# Patient Record
Sex: FEMALE | Race: WHITE | Employment: UNEMPLOYED | URBAN - METROPOLITAN AREA
[De-identification: names, ages, dates, MRNs, and addresses within clinical notes are randomized per-mention and may not be internally consistent; named-entity substitution may affect disease eponyms.]

---

## 2018-09-28 ENCOUNTER — HOSPITAL ENCOUNTER (EMERGENCY)
Facility: CLINIC | Age: 28
Discharge: HOME | End: 2018-09-29
Attending: EMERGENCY MEDICINE | Admitting: EMERGENCY MEDICINE
Payer: MEDICAID

## 2018-09-28 ENCOUNTER — APPOINTMENT (OUTPATIENT)
Dept: ULTRASOUND IMAGING | Facility: CLINIC | Age: 28
End: 2018-09-28
Attending: EMERGENCY MEDICINE
Payer: MEDICAID

## 2018-09-28 DIAGNOSIS — O20.0 THREATENED MISCARRIAGE IN EARLY PREGNANCY: ICD-10-CM

## 2018-09-28 LAB
BASOPHILS # BLD AUTO: 0 10E9/L (ref 0–0.2)
BASOPHILS NFR BLD AUTO: 0.1 %
DIFFERENTIAL METHOD BLD: ABNORMAL
EOSINOPHIL # BLD AUTO: 0.1 10E9/L (ref 0–0.7)
EOSINOPHIL NFR BLD AUTO: 1.3 %
ERYTHROCYTE [DISTWIDTH] IN BLOOD BY AUTOMATED COUNT: 14.9 % (ref 10–15)
HCT VFR BLD AUTO: 34.1 % (ref 35–47)
HGB BLD-MCNC: 11.9 G/DL (ref 11.7–15.7)
IMM GRANULOCYTES # BLD: 0 10E9/L (ref 0–0.4)
IMM GRANULOCYTES NFR BLD: 0 %
LYMPHOCYTES # BLD AUTO: 2.8 10E9/L (ref 0.8–5.3)
LYMPHOCYTES NFR BLD AUTO: 41.7 %
MCH RBC QN AUTO: 29.4 PG (ref 26.5–33)
MCHC RBC AUTO-ENTMCNC: 34.9 G/DL (ref 31.5–36.5)
MCV RBC AUTO: 84 FL (ref 78–100)
MONOCYTES # BLD AUTO: 0.6 10E9/L (ref 0–1.3)
MONOCYTES NFR BLD AUTO: 8.8 %
NEUTROPHILS # BLD AUTO: 3.2 10E9/L (ref 1.6–8.3)
NEUTROPHILS NFR BLD AUTO: 48.1 %
NRBC # BLD AUTO: 0 10*3/UL
NRBC BLD AUTO-RTO: 0 /100
PLATELET # BLD AUTO: 160 10E9/L (ref 150–450)
RBC # BLD AUTO: 4.05 10E12/L (ref 3.8–5.2)
WBC # BLD AUTO: 6.7 10E9/L (ref 4–11)

## 2018-09-28 PROCEDURE — 84702 CHORIONIC GONADOTROPIN TEST: CPT | Performed by: EMERGENCY MEDICINE

## 2018-09-28 PROCEDURE — 76801 OB US < 14 WKS SINGLE FETUS: CPT

## 2018-09-28 PROCEDURE — 99284 EMERGENCY DEPT VISIT MOD MDM: CPT | Mod: 25

## 2018-09-28 PROCEDURE — 85025 COMPLETE CBC W/AUTO DIFF WBC: CPT | Performed by: EMERGENCY MEDICINE

## 2018-09-28 PROCEDURE — 86901 BLOOD TYPING SEROLOGIC RH(D): CPT | Performed by: EMERGENCY MEDICINE

## 2018-09-28 NOTE — ED AVS SNAPSHOT
Emergency Department    [address] [facility] [address]    Phone:  [phone]    Fax:  [phone]                                       Ariana Moe   MRN: [id number]    Department:   Emergency Department   Date of Visit:  9/28/2018           Patient Information     Date Of Birth          1990        Your diagnoses for this visit were:     Threatened miscarriage in early pregnancy        You were seen by Chris Boucher MD.      Follow-up Information     Schedule an appointment as soon as possible for a visit with martinez doctor de cabecera.        Follow up with  Emergency Department.    Specialty:  EMERGENCY MEDICINE    Why:  As needed, If symptoms worsen    Contact information:    [address] [facility] [address] [phone]        Discharge Instructions       Discharge Instructions  Vaginal Bleeding in Pregnancy    Bleeding in early pregnancy can be a sign of a miscarriage or an abnormal pregnancy, but often is innocent and the pregnancy will continue normally. We may do blood pregnancy tests and ultrasound to try to determine what is causing the bleeding, but sometimes we are unable to tell. If this is the case, it will often require more time, more blood tests, and another ultrasound.     Generally, every Emergency Department visit should have a follow-up clinic visit with either a primary or a specialty clinic/provider. Please follow-up as instructed by your emergency provider today.    Return to the Emergency Department if:    You have severe abdominal (belly) or pelvic pain.    You faint, or feel lightheaded or dizzy.     Your bleeding gets much worse.    You pass any tissue--solid material that does not look like a blood clot. If you pass tissue, save it (even if you have to pull it out of the toilet) and put it in a plastic bag or jar and bring it in.      You have a fever of 100.5 F or higher.  If no pregnancy could be seen:    It may be that  everything is normal and it is just too early to see the pregnancy. It is also possible that you could have an ectopic pregnancy, which is a pregnancy in an abnormal location, such as in the tube ( tubal pregnancy ). We don t see evidence that this is likely today but we cannot exclude it with certainty until a pregnancy can be seen in the uterus (womb) and an ectopic pregnancy can cause severe internal bleeding or death so follow-up is very important.    You should not be alone, in case you suddenly become very sick.     You should not have sex or put anything in your vagina.     If a pregnancy was seen in your uterus:    If a heartbeat could be seen, the chance of miscarriage is lower but because you had bleeding the chance of a miscarriage still exists.    You should not have sex or put anything in your vagina.    Follow-up as directed with your OB/GYN provider.     Facts about miscarriage: We hope you do not have a miscarriage, but if you do, here are important things to know:    Early miscarriage is very common, and having one miscarriage does not mean you will have problems with another pregnancy.    Nothing you did caused it. Taking medicine, drinking alcohol, having sex, exercising, or falling down will not cause a miscarriage.     If you were given a prescription for medicine here today, be sure to read all of the information (including the package insert) that comes with your prescription.  This will include important information about the medicine, its side effects, and any warnings that you need to know about.  The pharmacist who fills the prescription can provide more information and answer questions you may have about the medicine.  If you have questions or concerns that the pharmacist cannot address, please call or return to the Emergency Department.   Remember that you can always come back to the Emergency Department if you are not able to see your regular provider in the amount of time listed above,  if you get any new symptoms, or if there is anything that worries you.      24 Hour Appointment Hotline       To make an appointment at any [facility], call [phone] ([phone]). If you don't have a family doctor or clinic, we will help you find one. [place] clinics are conveniently located to serve the needs of you and your family.             Review of your medicines      Notice     You have not been prescribed any medications.            Procedures and tests performed during your visit     CBC with platelets differential    HCG quantitative pregnancy    Peripheral IV catheter    Rh type    US OB < 14 Weeks Single      Orders Needing Specimen Collection     None      Pending Results     Date and Time Order Name Status Description    9/28/2018 2315 US OB < 14 Weeks Single Preliminary             Pending Culture Results     No orders found for last 3 day(s).            Pending Results Instructions     If you had any lab results that were not finalized at the time of your Discharge, you can call the ED Lab Result RN at [phone]. You will be contacted by this team for any positive Lab results or changes in treatment. The nurses are available 7 days a week from 10A to 6:30P.  You can leave a message 24 hours per day and they will return your call.        Test Results From Your Hospital Stay        9/28/2018 11:54 PM      Component Results     Component Value Ref Range & Units Status    WBC 6.7 4.0 - 11.0 10e9/L Final    RBC Count 4.05 3.8 - 5.2 10e12/L Final    Hemoglobin 11.9 11.7 - 15.7 g/dL Final    Hematocrit 34.1 (L) 35.0 - 47.0 % Final    MCV 84 78 - 100 fl Final    MCH 29.4 26.5 - 33.0 pg Final    MCHC 34.9 31.5 - 36.5 g/dL Final    RDW 14.9 10.0 - 15.0 % Final    Platelet Count 160 150 - 450 10e9/L Final    Diff Method Automated Method  Final    % Neutrophils 48.1 % Final    % Lymphocytes 41.7 % Final    % Monocytes 8.8 % Final    % Eosinophils 1.3 % Final    % Basophils 0.1 % Final    %  Immature Granulocytes 0.0 % Final    Nucleated RBCs 0 0 /100 Final    Absolute Neutrophil 3.2 1.6 - 8.3 10e9/L Final    Absolute Lymphocytes 2.8 0.8 - 5.3 10e9/L Final    Absolute Monocytes 0.6 0.0 - 1.3 10e9/L Final    Absolute Eosinophils 0.1 0.0 - 0.7 10e9/L Final    Absolute Basophils 0.0 0.0 - 0.2 10e9/L Final    Abs Immature Granulocytes 0.0 0 - 0.4 10e9/L Final    Absolute Nucleated RBC 0.0  Final         9/29/2018 12:25 AM      Component Results     Component Value Ref Range & Units Status    HCG Quantitative Serum 368963 (H) 0 - 5 IU/L Final         9/29/2018 12:40 AM      Component Results     Component    ABO    O    RH(D)    Pos    Specimen Expires    10/01/2018               9/29/2018 12:45 AM      Narrative     ULTRASOUND OBSTETRIC FIRST TRIMESTER  9/29/2018 12:27 AM     HISTORY: Early pregnancy. Vaginal bleeding.    COMPARISON: None.    FINDINGS: A gestational sac is present in the endometrial cavity. An  embryo is present in the gestational sac with a crown-rump length of  2.4 cm, corresponding to an estimated gestational age of 9 weeks 1  day. Cardiac activity is detected within the embryo at a rate of 163  bpm. A yolk sac is visualized. No subchorionic hemorrhage. The right  and left ovaries are unremarkable, measuring 2.8 x 2.8 x 1.2 cm and  4.5 x 3.5 x 2.9 cm respectively. The right ovary is unremarkable. A  2.2 x 2.1 x 1.4 cm simple cyst is present in the left ovary, likely a  functional cyst. No adnexal masses. No free fluid in the pelvis.        Impression     IMPRESSION:  1. Single live intrauterine pregnancy at 9 weeks 1 day estimated  gestational age based upon crown-rump length on this study. The  estimated date of delivery is 5/3/2019.  2. No subchorionic hemorrhage.                Clinical Quality Measure: Blood Pressure Screening     Your blood pressure was checked while you were in the emergency department today. The last reading we obtained was  BP: 94/59 . Please read the guidelines  "below about what these numbers mean and what you should do about them.  If your systolic blood pressure (the top number) is less than 120 and your diastolic blood pressure (the bottom number) is less than 80, then your blood pressure is normal. There is nothing more that you need to do about it.  If your systolic blood pressure (the top number) is 120-139 or your diastolic blood pressure (the bottom number) is 80-89, your blood pressure may be higher than it should be. You should have your blood pressure rechecked within a year by a primary care provider.  If your systolic blood pressure (the top number) is 140 or greater or your diastolic blood pressure (the bottom number) is 90 or greater, you may have high blood pressure. High blood pressure is treatable, but if left untreated over time it can put you at risk for heart attack, stroke, or kidney failure. You should have your blood pressure rechecked by a primary care provider within the next 4 weeks.  If your provider in the emergency department today gave you specific instructions to follow-up with your doctor or provider even sooner than that, you should follow that instruction and not wait for up to 4 weeks for your follow-up visit.        Thank you for choosing [facility]       Thank you for choosing [facility] for your care. Our goal is always to provide you with excellent care. Hearing back from our patients is one way we can continue to improve our services. Please take a few minutes to complete the written survey that you may receive in the mail after you visit with us. Thank you!        [employer] Information     Assembly lets you send messages to your doctor, view your test results, renew your prescriptions, schedule appointments and more. To sign up, go to [email or web address] . Click on \"Log in\" on the left side of the screen, which will take you to the Welcome page. Then click on \"Sign up Now\" on the right side of the page.     You will be asked to enter " the access code listed below, as well as some personal information. Please follow the directions to create your username and password.     Your access code is: [email or web address]  Expires: 2018  1:09 AM     Your access code will  in 90 days. If you need help or a new code, please call your [place] clinic or [phone].        Care EveryWhere ID     This is your Care EveryWhere ID. This could be used by other organizations to access your [place] medical records  [id number]        Equal Access to Services     [facility] Hospital[facility] : Jovita Lugo, pily ramírez, benita gtzalgino cho, kathy richmond . So [facility] [phone].    ATENCIÓN: Si habla español, tiene a martinez disposición servicios gratuitos de asistencia lingüística. Llame al [phone].    We comply with applicable federal civil rights laws and [place] laws. We do not discriminate on the basis of race, color, national origin, age, disability, sex, sexual orientation, or gender identity.            After Visit Summary       This is your record. Keep this with you and show to your community pharmacist(s) and doctor(s) at your next visit.

## 2018-09-28 NOTE — ED AVS SNAPSHOT
Emergency Department    [address] [facility] [address]    Phone:  [phone]    Fax:  [phone]                                       Ariana Moe   MRN: [id number]    Department:   Emergency Department   Date of Visit:  9/28/2018           After Visit Summary Signature Page     I have received my discharge instructions, and my questions have been answered. I have discussed any challenges I see with this plan with the nurse or doctor.    ..........................................................................................................................................  Patient/Patient Representative Signature      ..........................................................................................................................................  Patient Representative Print Name and Relationship to Patient    ..................................................               ................................................  Date                                   Time    ..........................................................................................................................................  Reviewed by Signature/Title    ...................................................              ..............................................  Date                                               Time          22EPIC Rev 08/18

## 2018-09-29 VITALS
RESPIRATION RATE: 18 BRPM | DIASTOLIC BLOOD PRESSURE: 59 MMHG | HEART RATE: 82 BPM | TEMPERATURE: 97.9 F | SYSTOLIC BLOOD PRESSURE: 94 MMHG | OXYGEN SATURATION: 98 %

## 2018-09-29 LAB
ABO + RH BLD: NORMAL
ABO + RH BLD: NORMAL
B-HCG SERPL-ACNC: ABNORMAL IU/L (ref 0–5)
SPECIMEN EXP DATE BLD: NORMAL

## 2018-09-29 NOTE — ED NOTES
Patient presents to ED with reports of vaginal bleeding that occurred about 40min pta. Patient reports she is 10 weeks pregnant. Patient states she has seen a doctor for this pregnancy but has not had an ultrasound yet. Patient reports the bleeding was bright red, heavy and that she passed some large blood clots. Pt reported lower abdominal cramping. Patient denies feeling dizzy or lightheaded. This RN offered patient tylenol for pain to which patient declined. Patient reports this is her second pregnancy and has 1 living child. Patient Welsh speaking, jabber/i pad used when assessing, triaging and updating patient on plan of care. Patient expressed understanding. In person  requested. Family with patient. Call light within reach.

## 2018-09-29 NOTE — DISCHARGE INSTRUCTIONS
Discharge Instructions  Vaginal Bleeding in Pregnancy    Bleeding in early pregnancy can be a sign of a miscarriage or an abnormal pregnancy, but often is innocent and the pregnancy will continue normally. We may do blood pregnancy tests and ultrasound to try to determine what is causing the bleeding, but sometimes we are unable to tell. If this is the case, it will often require more time, more blood tests, and another ultrasound.     Generally, every Emergency Department visit should have a follow-up clinic visit with either a primary or a specialty clinic/provider. Please follow-up as instructed by your emergency provider today.    Return to the Emergency Department if:    You have severe abdominal (belly) or pelvic pain.    You faint, or feel lightheaded or dizzy.     Your bleeding gets much worse.    You pass any tissue--solid material that does not look like a blood clot. If you pass tissue, save it (even if you have to pull it out of the toilet) and put it in a plastic bag or jar and bring it in.      You have a fever of 100.5 F or higher.  If no pregnancy could be seen:    It may be that everything is normal and it is just too early to see the pregnancy. It is also possible that you could have an ectopic pregnancy, which is a pregnancy in an abnormal location, such as in the tube ( tubal pregnancy ). We don t see evidence that this is likely today but we cannot exclude it with certainty until a pregnancy can be seen in the uterus (womb) and an ectopic pregnancy can cause severe internal bleeding or death so follow-up is very important.    You should not be alone, in case you suddenly become very sick.     You should not have sex or put anything in your vagina.     If a pregnancy was seen in your uterus:    If a heartbeat could be seen, the chance of miscarriage is lower but because you had bleeding the chance of a miscarriage still exists.    You should not have sex or put anything in your  vagina.    Follow-up as directed with your OB/GYN provider.     Facts about miscarriage: We hope you do not have a miscarriage, but if you do, here are important things to know:    Early miscarriage is very common, and having one miscarriage does not mean you will have problems with another pregnancy.    Nothing you did caused it. Taking medicine, drinking alcohol, having sex, exercising, or falling down will not cause a miscarriage.     If you were given a prescription for medicine here today, be sure to read all of the information (including the package insert) that comes with your prescription.  This will include important information about the medicine, its side effects, and any warnings that you need to know about.  The pharmacist who fills the prescription can provide more information and answer questions you may have about the medicine.  If you have questions or concerns that the pharmacist cannot address, please call or return to the Emergency Department.   Remember that you can always come back to the Emergency Department if you are not able to see your regular provider in the amount of time listed above, if you get any new symptoms, or if there is anything that worries you.

## 2018-09-29 NOTE — ED PROVIDER NOTES
History     Chief Complaint:  Vaginal Bleeding     HPI   Ariana Moe is a 28 year old female who presents with family for evaluation of vaginal bleeding.  Her last menstrual period was around July 23 she reports being about 10 weeks pregnant, having had positive pregnancy test at the [facility] though she has not had an ultrasound yet this pregnancy.  She had been doing well until about 30 minutes prior to arrival when she passed a fairly large blood clot per vagina, though did not pass any recognizable tissue.  She had very mild suprapubic cramping.  No urinary symptoms.  This is her second pregnancy, with the first pregnancy having been uneventful and leading to a healthy child.  She denies having any other major medical problems.  She has not taken any medications prior to arrival.  She does not know her blood type.    Allergies:  No known drug allergies     Medications:    No current outpatient prescriptions on file.    Past Medical History:    History reviewed. No pertinent past medical history.    Past Surgical History:    History reviewed. No pertinent surgical history.    Family History:    No family history on file.    Social History:  Smoking status: Never smoker  Alcohol use: No   Marital status:  [2]  Accompanied to the ED by  and son.      Review of Systems   All other systems reviewed and are negative.    Physical Exam   Patient Vitals for the past 24 hrs:   BP Temp Temp src Pulse Heart Rate Resp SpO2   09/29/18 0051 94/59 - - - 71 18 98 %   09/28/18 2254 125/73 97.9  F (36.6  C) Oral 82 - 16 99 %      Physical Exam  General: nontoxic appearing woman sitting upright in room 26, male  and child at bedside  HENT: mucous membranes moist  CV: regular rate, regular rhythm  Resp: clear throughout, normal effort  GI: abdomen soft and nontender, no guarding, no palpable masses or uterine fundus  : deferred  MSK: no bony tenderness, no CVAT  Skin: appropriately warm and  dry  Neuro: alert, clear speech, oriented  Psych: normal mood and affect    Emergency Department Course   Imaging:  Radiographic findings were communicated with the patient who voiced understanding of the findings.  US OB<14 Weeks w Transvaginal Single  1. Single live intrauterine pregnancy at 9 weeks 1 day estimated  gestational age based upon crown-rump length on this study. The  estimated date of delivery is 5/3/2019.  2. No subchorionic hemorrhage.  As read by Radiology.     Laboratory:  CBC: WNL (WBC 6.7, HGB 11.9, )  HC (H)  Rh type: O, Rh Positive    Emergency Department Course:  IV inserted and blood drawn for basic laboratory. Results as noted above.    Past medical records, nursing notes, and vitals reviewed.  2316: I performed an exam of the patient and obtained history, as documented above.    0051: I rechecked the patient and updated the patient regarding the findings.   Patient discharged home with instructions regarding supportive care, medications, and reasons to return. The importance of close follow-up was reviewed.      Impression & Plan    Medical Decision Making:  Differential diagnosis includes threatened miscarriage from a subchorionic hemorrhage, ectopic pregnancy, coagulopathy, and others.  While there are signs of intrauterine pregnancy with fetal heart rate at this time, I did caution the patient that the symptoms could represent the early stages of a developing miscarriage and we discussed the importance of close outpatient follow-up.  Her bleeding essentially resolved here and she did not wish to undergo pelvic exam.  Suspicion for infection is very low.  No indication for RhoGam.  Initial hematologic labs are satisfactory.  Return precautions reviewed and discussed in detail through the official bedside .  She was discharged home in improved condition.    Diagnosis:    ICD-10-CM   1. Threatened miscarriage in early pregnancy O20.0     Disposition:  discharged  "to home    This record was created at least in part using electronic voice recognition software, so please excuse any \"typos.\"    Mary Chase  9/28/2018    EMERGENCY DEPARTMENT  I, Mary Chase, am serving as a scribe at 11:16 PM on 9/28/2018 to document services personally performed by Chris Boucher MD based on my observations and the provider's statements to me.       Chris Boucher MD  09/29/18 0428    "